# Patient Record
Sex: MALE | Race: WHITE | NOT HISPANIC OR LATINO | Employment: OTHER | URBAN - METROPOLITAN AREA
[De-identification: names, ages, dates, MRNs, and addresses within clinical notes are randomized per-mention and may not be internally consistent; named-entity substitution may affect disease eponyms.]

---

## 2017-10-19 DIAGNOSIS — M25.561 RIGHT KNEE PAIN, UNSPECIFIED CHRONICITY: Primary | ICD-10-CM

## 2017-10-19 DIAGNOSIS — M25.571 RIGHT ANKLE PAIN, UNSPECIFIED CHRONICITY: ICD-10-CM

## 2017-10-20 ENCOUNTER — HOSPITAL ENCOUNTER (OUTPATIENT)
Dept: RADIOLOGY | Facility: HOSPITAL | Age: 82
Discharge: HOME | End: 2017-10-20
Attending: ORTHOPAEDIC SURGERY
Payer: MEDICARE

## 2017-10-20 ENCOUNTER — OFFICE VISIT (OUTPATIENT)
Dept: ORTHOPEDICS | Facility: CLINIC | Age: 82
End: 2017-10-20
Payer: MEDICARE

## 2017-10-20 VITALS — BODY MASS INDEX: 23.49 KG/M2 | WEIGHT: 183 LBS | HEIGHT: 74 IN

## 2017-10-20 DIAGNOSIS — Z96.661 HISTORY OF RIGHT ANKLE JOINT REPLACEMENT: ICD-10-CM

## 2017-10-20 DIAGNOSIS — M25.561 RIGHT KNEE PAIN, UNSPECIFIED CHRONICITY: ICD-10-CM

## 2017-10-20 DIAGNOSIS — M25.571 RIGHT ANKLE PAIN, UNSPECIFIED CHRONICITY: ICD-10-CM

## 2017-10-20 DIAGNOSIS — M25.561 RIGHT KNEE PAIN, UNSPECIFIED CHRONICITY: Primary | ICD-10-CM

## 2017-10-20 PROCEDURE — 99999 PR PBB SHADOW E&M-EST. PATIENT-LVL II: CPT | Mod: PBBFAC,,, | Performed by: ORTHOPAEDIC SURGERY

## 2017-10-20 PROCEDURE — 73630 X-RAY EXAM OF FOOT: CPT | Mod: TC,PO,RT

## 2017-10-20 PROCEDURE — 73560 X-RAY EXAM OF KNEE 1 OR 2: CPT | Mod: 26,59,LT, | Performed by: RADIOLOGY

## 2017-10-20 PROCEDURE — 99212 OFFICE O/P EST SF 10 MIN: CPT | Mod: PBBFAC,25,PO | Performed by: ORTHOPAEDIC SURGERY

## 2017-10-20 PROCEDURE — 73562 X-RAY EXAM OF KNEE 3: CPT | Mod: 26,RT,, | Performed by: RADIOLOGY

## 2017-10-20 PROCEDURE — 73610 X-RAY EXAM OF ANKLE: CPT | Mod: 26,RT,, | Performed by: RADIOLOGY

## 2017-10-20 PROCEDURE — 73560 X-RAY EXAM OF KNEE 1 OR 2: CPT | Mod: TC,PO,LT

## 2017-10-20 PROCEDURE — 73630 X-RAY EXAM OF FOOT: CPT | Mod: 26,RT,, | Performed by: RADIOLOGY

## 2017-10-20 PROCEDURE — 97760 ORTHOTIC MGMT&TRAING 1ST ENC: CPT | Mod: PBBFAC,PO | Performed by: ORTHOPAEDIC SURGERY

## 2017-10-20 PROCEDURE — 73610 X-RAY EXAM OF ANKLE: CPT | Mod: TC,PO,RT

## 2017-10-20 PROCEDURE — 99203 OFFICE O/P NEW LOW 30 MIN: CPT | Mod: 25,S$PBB,, | Performed by: ORTHOPAEDIC SURGERY

## 2017-10-20 NOTE — PROGRESS NOTES
Asa Greene, 83 years old, remains very active, complaining of pain in his   right ankle and knee for a few weeks' time.  He said he tweaked his knee during   exercises.  Pain is 1/10 on good days, 5/10 on bad days.  He recently ran a [employer]   just a week ago.  He does yoga 4 times a week, runs road races, remains very   active.    PHYSICAL EXAMINATION:  Today shows his knee exam is stable without signs of   infection.  His ankle exam is stable, no signs of infection.  Skin is intact.    Compartments are soft.    X-rays show well preserved joint spacing in both his knees and his ankles.    ASSESSMENT:  Knee and ankle pain in an 83-year-old who remains very active.    PLAN:  We will treat this very conservatively.  We will get him fitted with some   braces.  Encouraged him to continue with his very active lifestyle and   strengthening type exercises and we will see him back as needed.      PBB/HN  dd: 10/20/2017 11:25:25 (CDT)  td: 10/20/2017 19:46:11 (CDT)  Doc ID   #[id number]  Job ID #[id number]    CC:     Further History  Aching pain  Worse with activity  Relieved with rest  No other associated symptoms  No other radiation    Further Exam  Alert and oriented  Pleasant  Contralateral limb has appropriate range of motion for age and condition  Contralateral limb has appropriate strength for age and condition  Contralateral limb has appropriate stability  for age and condition  No adenopathy  Pulses are appropriate for current condition  Skin is intact        Chief Complaint    Chief Complaint   Patient presents with    Right Knee - Pain    Right Ankle - Pain, Injury       HPI  Asa Greene is a 83 y.o.  male who presents with       Past Medical History  Past Medical History:   Diagnosis Date    Arthritis     History of colon polyps     HLD (hyperlipidemia)        Past Surgical History  Past Surgical History:   Procedure Laterality Date    COLONOSCOPY  12/14/2010  [place]    Redundant colon.  Enlarged prostate.     COLONOSCOPY W/ POLYPECTOMY  2008  [facility]    Benign polyps    PROSTATE SURGERY  Jan 2012       Medications  Current Outpatient Prescriptions   Medication Sig    ascorbic acid (VITAMIN C) 100 MG tablet Take 100 mg by mouth once daily.      aspirin (ECOTRIN) 81 MG EC tablet Take 81 mg by mouth once daily.      calcium carbonate (OS-EDMUNDO) 600 mg (1,500 mg) Tab Take 600 mg by mouth once daily.      cholecalciferol, vitamin D3, (VITAMIN D3) 2,000 unit Tab Take by mouth once daily.      citalopram (CELEXA) 10 MG tablet Take 10 mg by mouth once daily.      clopidogrel (PLAVIX) 75 mg tablet Take 75 mg by mouth once daily.    fish oil-omega-3 fatty acids 300-1,000 mg capsule Take 1 g by mouth 2 (two) times daily.      gabapentin (NEURONTIN) 300 mg tablet Take 300 mg by mouth 4 (four) times daily.      levmefolate-B6 phos-methyl-B12 (METANX) 3-35-2 mg Tab Take 2 tablets by mouth once daily.    MAGNESIUM CHLORIDE (SLOW-MAG ORAL) Take by mouth.    midodrine (PROAMATINE) 2.5 MG Tab Take 2.5 mg by mouth 2 (two) times daily with meals.      multivitamin capsule Take 1 capsule by mouth once daily.    pravastatin (PRAVACHOL) 40 MG tablet Take 40 mg by mouth once daily.      propafenone (RHTHYMOL) 150 MG Tab Take 150 mg by mouth 2 (two) times daily.       Current Facility-Administered Medications   Medication    triamcinolone acetonide injection 20 mg       Allergies  Review of patient's allergies indicates:  No Known Allergies    Family History  No family history on file.    Social History  Social History     Social History    Marital status:      Spouse name: N/A    Number of children: N/A    Years of education: N/A     Occupational History    Not on file.     Social History Main Topics    Smoking status: Never Smoker    Smokeless tobacco: Never Used    Alcohol use Yes      Comment: seldom    Drug use: Unknown    Sexual activity: Not on file     Other Topics Concern    Not on file     Social  History Narrative    No narrative on file               Review of Systems     Constitutional: Negative    HENT: Negative  Eyes: Negative  Respiratory: Negative  Cardiovascular: Negative  Musculoskeletal: HPI  Skin: Negative  Neurological: Negative  Hematological: Negative  Endocrine: Negative                 Physical Exam    There were no vitals filed for this visit.  Body mass index is 23.5 kg/m².  Physical Examination:     General appearance -  well appearing, and in no distress  Mental status - awake  Neck - supple  Chest -  symmetric air entry  Heart - normal rate   Abdomen - soft      Assessment     1. Right knee pain, unspecified chronicity    2. Right ankle pain, unspecified chronicity    3. History of right ankle joint replacement          PlanWe performed a custom orthotic/brace fitting, adjusting and training with the patient. The patient demonstrated understanding and proper care. This was performed for 15 minutes.

## 2020-04-01 ENCOUNTER — NURSE TRIAGE (OUTPATIENT)
Dept: ADMINISTRATIVE | Facility: CLINIC | Age: 85
End: 2020-04-01

## 2020-04-01 NOTE — TELEPHONE ENCOUNTER
Pt daughter called in to states that her father has,Cough, nasal congestion, sore throat and intermitted fever x 1 week. Wanted information on if father should be tested for COVID-19, pt is not currently with daughter and she states pt is currently in no distress. Pt advised per protocol and [facility] system directive to [facility], code given.    Reason for Disposition   [1] Caller is not with the adult (patient) AND [2] probable NON-URGENT symptoms    Additional Information   Negative: RN needs further essential information from caller in order to complete triage   Negative: Requesting regular office appointment   Negative: [1] Caller requesting NON-URGENT health information AND [2] PCP's office is the best resource   Negative: Question about upcoming scheduled test, no triage required and triager able to answer question   Negative: General information question, no triage required and triager able to answer question   Negative: Health Information question, no triage required and triager able to answer question    Protocols used: INFORMATION ONLY CALL-A-

## 2020-08-05 PROBLEM — I25.10 CORONARY ATHEROSCLEROSIS OF NATIVE CORONARY ARTERY: Status: ACTIVE | Noted: 2020-08-05

## 2023-06-26 PROBLEM — A41.9 SEVERE SEPSIS: Status: ACTIVE | Noted: 2023-06-26

## 2023-06-26 PROBLEM — N39.0 UTI (URINARY TRACT INFECTION): Status: ACTIVE | Noted: 2023-06-26

## 2023-06-26 PROBLEM — R65.20 SEVERE SEPSIS: Status: ACTIVE | Noted: 2023-06-26

## 2023-06-26 PROBLEM — E78.5 HLD (HYPERLIPIDEMIA): Status: ACTIVE | Noted: 2023-06-26

## 2023-06-26 PROBLEM — G90.1 DYSAUTONOMIA: Status: ACTIVE | Noted: 2023-06-26

## 2023-10-02 PROBLEM — N39.0 UTI (URINARY TRACT INFECTION): Status: RESOLVED | Noted: 2023-06-26 | Resolved: 2023-10-02

## 2023-10-02 PROBLEM — A41.9 SEVERE SEPSIS: Status: RESOLVED | Noted: 2023-06-26 | Resolved: 2023-10-02

## 2023-10-02 PROBLEM — R65.20 SEVERE SEPSIS: Status: RESOLVED | Noted: 2023-06-26 | Resolved: 2023-10-02

## 2023-12-27 PROBLEM — K52.89 STERCORAL COLITIS: Status: ACTIVE | Noted: 2023-12-27

## 2023-12-27 PROBLEM — K59.00 CONSTIPATION: Status: ACTIVE | Noted: 2023-12-27

## 2023-12-27 PROBLEM — K56.41 FECAL IMPACTION: Status: ACTIVE | Noted: 2023-12-27

## 2023-12-28 PROBLEM — E87.6 HYPOKALEMIA: Status: ACTIVE | Noted: 2023-12-28

## 2023-12-28 PROBLEM — E83.42 HYPOMAGNESEMIA: Status: ACTIVE | Noted: 2023-12-28
